# Patient Record
Sex: MALE | Race: WHITE
[De-identification: names, ages, dates, MRNs, and addresses within clinical notes are randomized per-mention and may not be internally consistent; named-entity substitution may affect disease eponyms.]

---

## 2019-03-29 ENCOUNTER — HOSPITAL ENCOUNTER (EMERGENCY)
Dept: HOSPITAL 53 - M ED | Age: 24
Discharge: HOME | End: 2019-03-29
Payer: COMMERCIAL

## 2019-03-29 VITALS — BODY MASS INDEX: 30.76 KG/M2 | WEIGHT: 227.08 LBS | HEIGHT: 72 IN

## 2019-03-29 VITALS — DIASTOLIC BLOOD PRESSURE: 85 MMHG | SYSTOLIC BLOOD PRESSURE: 168 MMHG

## 2019-03-29 DIAGNOSIS — J45.909: ICD-10-CM

## 2019-03-29 DIAGNOSIS — H92.03: ICD-10-CM

## 2019-03-29 DIAGNOSIS — H66.93: Primary | ICD-10-CM

## 2019-06-11 ENCOUNTER — HOSPITAL ENCOUNTER (EMERGENCY)
Dept: HOSPITAL 53 - M ED | Age: 24
Discharge: HOME | End: 2019-06-11
Payer: COMMERCIAL

## 2019-06-11 VITALS — WEIGHT: 225.47 LBS | BODY MASS INDEX: 30.54 KG/M2 | HEIGHT: 72 IN

## 2019-06-11 VITALS — DIASTOLIC BLOOD PRESSURE: 63 MMHG | SYSTOLIC BLOOD PRESSURE: 136 MMHG

## 2019-06-11 DIAGNOSIS — H66.93: ICD-10-CM

## 2019-06-11 DIAGNOSIS — J45.909: ICD-10-CM

## 2019-06-11 DIAGNOSIS — J18.9: Primary | ICD-10-CM

## 2019-06-11 LAB
FLUAV RNA UPPER RESP QL NAA+PROBE: NEGATIVE
FLUBV RNA UPPER RESP QL NAA+PROBE: NEGATIVE

## 2019-06-11 PROCEDURE — 87880 STREP A ASSAY W/OPTIC: CPT

## 2019-06-11 PROCEDURE — 87502 INFLUENZA DNA AMP PROBE: CPT

## 2019-06-11 PROCEDURE — 96374 THER/PROPH/DIAG INJ IV PUSH: CPT

## 2019-06-11 PROCEDURE — 96375 TX/PRO/DX INJ NEW DRUG ADDON: CPT

## 2019-06-11 PROCEDURE — 71046 X-RAY EXAM CHEST 2 VIEWS: CPT

## 2019-06-11 PROCEDURE — 99284 EMERGENCY DEPT VISIT MOD MDM: CPT

## 2019-06-11 NOTE — REP
Chest x-ray:  Two views.

 

History:  Cough and fever.

 

Findings:  There is an infiltrate in the left lower lobe consistent with

pneumonia.  Remaining lung fields are clear.  Heart is not enlarged.  Pulmonary

vasculature is not increased.  Pleural angles are sharp.  No bony abnormality is

seen.

 

Impression:

 

Left lower lobe pneumonia.

 

 

Electronically Signed by

Gera George MD 06/11/2019 09:44 A